# Patient Record
Sex: FEMALE | Race: AMERICAN INDIAN OR ALASKA NATIVE
[De-identification: names, ages, dates, MRNs, and addresses within clinical notes are randomized per-mention and may not be internally consistent; named-entity substitution may affect disease eponyms.]

---

## 2019-03-13 ENCOUNTER — HOSPITAL ENCOUNTER (EMERGENCY)
Dept: HOSPITAL 5 - ED | Age: 64
Discharge: HOME | End: 2019-03-13
Payer: SELF-PAY

## 2019-03-13 VITALS — DIASTOLIC BLOOD PRESSURE: 73 MMHG | SYSTOLIC BLOOD PRESSURE: 147 MMHG
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
## 2019-03-13 DIAGNOSIS — E11.9: ICD-10-CM

## 2019-03-13 DIAGNOSIS — S39.012A: Primary | ICD-10-CM

## 2019-03-13 DIAGNOSIS — M54.31: ICD-10-CM

## 2019-03-13 DIAGNOSIS — Z95.5: ICD-10-CM
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
## 2019-03-13 DIAGNOSIS — I25.2: ICD-10-CM

## 2019-03-13 DIAGNOSIS — I10: ICD-10-CM
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
## 2019-03-13 DIAGNOSIS — Z79.899: ICD-10-CM

## 2019-03-13 PROCEDURE — 96372 THER/PROPH/DIAG INJ SC/IM: CPT

## 2019-03-13 PROCEDURE — 73502 X-RAY EXAM HIP UNI 2-3 VIEWS: CPT

## 2019-03-13 PROCEDURE — 99283 EMERGENCY DEPT VISIT LOW MDM: CPT

## 2019-03-13 NOTE — EMERGENCY DEPARTMENT REPORT
ED Back Pain/Injury HPI





- General


Chief Complaint: Pain General


Stated Complaint: LEG PAIN/HARD TO WALK


Time Seen by Provider: 03/13/19 15:00


Source: patient


Limitations: No Limitations





- History of Present Illness


Initial Comments: 





This is a 63-year-old female nontoxic, well nourished in appearance, no acute 

signs of distress presents to the ED with c/o of acute on chronic lower back 

pain.  Patient stated that the past 2 days he was moving and developed this 

pain.  Patient states has history of sciatica nerve pain which is similar 

symptoms as today.  Patient states that pain radiates through to his right lower

extremity.  Patient denies any trauma.  Denies any bladder or bowel instability.

 Patient denies any urinary symptoms.  Denies any fever, chills, nausea, vomitin

g, headache, stiff neck, chest pain or shortness of breath.  Patient denies any 

numbness or tingling.  Denies any allergies. 


MD Complaint: back pain


Similar Symptoms Previously: Yes


Radiation: right leg


Severity: mild


Severity scale (0 -10): 8


Quality: aching


Consistency: intermittent


Improves With: immobilization


Worsens With: movement, walking


Context: while lifting, turning/twisting


Associated Symptoms: denies other symptoms.  denies: confusion, weakness, chest 

pain, numbness, difficulty walking, cough, difficulty urinating, diaphoresis, 

incontinence, fever/chills, constipation, headaches, abdominal pain, loss of 

appetite, malaise, nausea/vomiting, rash, seizure, shortness of breath, syncope





- Related Data


                                  Previous Rx's











 Medication  Instructions  Recorded  Last Taken  Type


 


Ibuprofen [Motrin] 600 mg PO Q8H PRN #50 tablet 08/26/14 Unknown Rx


 


Metoprolol [Lopressor TAB] 50 mg PO BID #60 tablet 10/29/14 04/13/15 Rx


 


glyBURIDE [Diabeta] 2.5 mg PO BID #60 tablet 10/29/14 04/13/15 Rx


 


metFORMIN [Glucophage] 850 mg PO BID #60 tablet 10/29/14 04/13/15 Rx


 


Pantoprazole Sodium [Protonix] 20 mg PO BID #60 tablet. 04/14/15 Unknown Rx


 


Simvastatin 20 mg PO QHS #30 tablet 04/14/15 Unknown Rx


 


Fluticasone [Flonase] 1 spray NS QDAY #1 bottle 12/01/15 Unknown Rx


 


predniSONE [Deltasone] 20 mg PO QDAY #5 tab 12/01/15 Unknown Rx


 


Cyclobenzaprine HCl [Flexeril 5 MG 5 mg PO TID #20 tab 12/20/15 Unknown Rx





TAB]    


 


Docusate Sodium [Colace] 100 mg PO BID #14 capsule 12/20/15 Unknown Rx


 


HYDROcodone/APAP 7.5-325 [Norco 1 each PO Q6HR PRN #20 tablet 12/20/15 Unknown 

Rx





7.5/325]    


 


Amoxicillin [Amoxicillin TAB] 875 mg PO BID #20 tablet 08/26/16 Unknown Rx


 


Benzonatate [Tessalon Perles] 100 mg PO Q8HR #20 capsule 08/26/16 Unknown Rx


 


Levothyroxine (Nf) [Synthroid (Nf)] 200 mcg PO QAM #30 tablet 08/26/16 Unknown 

Rx


 


Omeprazole 40 mg PO DAILY #30 capsule. 12/28/18 Unknown Rx


 


traMADol [Ultram 50 MG tab] 50 mg PO Q6HR PRN #12 tablet 12/28/18 Unknown Rx


 


Cyclobenzaprine HCl [Flexeril 5 MG 5 mg PO QHS PRN #10 tab 03/13/19 Unknown Rx





TAB]    


 


Ibuprofen [Motrin] 600 mg PO Q8H PRN #10 tablet 03/13/19 Unknown Rx











                                    Allergies











Allergy/AdvReac Type Severity Reaction Status Date / Time


 


No Known Allergies Allergy   Verified 12/20/15 04:24














ED Review of Systems


ROS: 


Stated complaint: LEG PAIN/HARD TO WALK


Other details as noted in HPI





Constitutional: denies: chills, fever


Eyes: denies: eye pain, eye discharge, vision change


ENT: denies: ear pain, throat pain


Respiratory: denies: cough, shortness of breath, wheezing


Cardiovascular: denies: chest pain, palpitations


Endocrine: no symptoms reported


Gastrointestinal: denies: abdominal pain, nausea, diarrhea


Genitourinary: denies: urgency, dysuria, discharge


Musculoskeletal: back pain.  denies: joint swelling, arthralgia


Skin: denies: rash, lesions


Neurological: denies: headache, weakness, paresthesias


Psychiatric: denies: anxiety, depression


Hematological/Lymphatic: denies: easy bleeding, easy bruising





ED Past Medical Hx





- Past Medical History


Previous Medical History?: Yes


Hx Hypertension: Yes


Hx Heart Attack/AMI: Yes


Hx Diabetes: Yes


Additional medical history: Chronic back pain





- Surgical History


Past Surgical History?: Yes


Hx Coronary Stent: Yes





- Social History


Smoking Status: Never Smoker


Substance Use Type: None





- Medications


Home Medications: 


                                Home Medications











 Medication  Instructions  Recorded  Confirmed  Last Taken  Type


 


Ibuprofen [Motrin] 600 mg PO Q8H PRN #50 tablet 08/26/14 04/14/15 Unknown Rx


 


Metoprolol [Lopressor TAB] 50 mg PO BID #60 tablet 10/29/14 04/14/15 04/13/15 Rx


 


glyBURIDE [Diabeta] 2.5 mg PO BID #60 tablet 10/29/14 04/14/15 04/13/15 Rx


 


metFORMIN [Glucophage] 850 mg PO BID #60 tablet 10/29/14 04/14/15 04/13/15 Rx


 


Pantoprazole Sodium [Protonix] 20 mg PO BID #60 tablet. 04/14/15  Unknown Rx


 


Simvastatin 20 mg PO QHS #30 tablet 04/14/15  Unknown Rx


 


Fluticasone [Flonase] 1 spray NS QDAY #1 bottle 12/01/15  Unknown Rx


 


predniSONE [Deltasone] 20 mg PO QDAY #5 tab 12/01/15  Unknown Rx


 


Cyclobenzaprine HCl [Flexeril 5 MG 5 mg PO TID #20 tab 12/20/15  Unknown Rx





TAB]     


 


Docusate Sodium [Colace] 100 mg PO BID #14 capsule 12/20/15  Unknown Rx


 


HYDROcodone/APAP 7.5-325 [Norco 1 each PO Q6HR PRN #20 tablet 12/20/15  Unknown 

Rx





7.5/325]     


 


Amoxicillin [Amoxicillin TAB] 875 mg PO BID #20 tablet 08/26/16  Unknown Rx


 


Benzonatate [Tessalon Perles] 100 mg PO Q8HR #20 capsule 08/26/16  Unknown Rx


 


Levothyroxine (Nf) [Synthroid (Nf)] 200 mcg PO QAM #30 tablet 08/26/16  Unknown 

Rx


 


Omeprazole 40 mg PO DAILY #30 capsule. 12/28/18  Unknown Rx


 


traMADol [Ultram 50 MG tab] 50 mg PO Q6HR PRN #12 tablet 12/28/18  Unknown Rx


 


Cyclobenzaprine HCl [Flexeril 5 MG 5 mg PO QHS PRN #10 tab 03/13/19  Unknown Rx





TAB]     


 


Ibuprofen [Motrin] 600 mg PO Q8H PRN #10 tablet 03/13/19  Unknown Rx














ED Physical Exam





- General


Limitations: No Limitations


General appearance: alert, in no apparent distress





- Head


Head exam: Present: atraumatic, normocephalic





- Extremities Exam


Extremities exam: Present: normal inspection, full ROM, normal capillary refill.

  Absent: tenderness, calf tenderness





- Back Exam


Back exam: Present: normal inspection, full ROM, paraspinal tenderness (right 

sided paraspinal lumbar).  Absent: tenderness, CVA tenderness (R), CVA 

tenderness (L), muscle spasm, vertebral tenderness, rash noted





- Expanded Back Exam


  ** Expanded


Back exam: Absent: saddle anesthesia


Back exam: Negative Straight Leg Raising: Right, Left





- Neurological Exam


Neurological exam: Present: alert, oriented X3





- Psychiatric


Psychiatric exam: Present: normal affect, normal mood





- Skin


Skin exam: Present: warm, dry, intact, normal color.  Absent: rash





ED Course





                                   Vital Signs











  03/13/19





  14:59


 


Temperature 97.9 F


 


Pulse Rate 79


 


Respiratory 18





Rate 


 


Blood Pressure 147/73


 


O2 Sat by Pulse 97





Oximetry 














- Reevaluation(s)


Reevaluation #1: 





03/13/19 16:17


Patient is speaking in full sentences with no signs of distress noted.





ED Medical Decision Making





- Medical Decision Making





This is a 63-year-old female that presents with sciatica.  Patient is stable was

 examined by me.  There is no spinal tenderness.  There is no cauda equina synd

royal during examination.  No bladder or bowel instability. Patient received 

Toradol 30 mg IM in the ED which preceded his symptoms has resolved and 

subsided.  Patient is discharged with muscle relaxant and Motrin.  Patient was 

instructed not to operate any machinery while taking muscle relaxant as they 

cause her drowsiness.  Patient was referred to Follow-up with a primary care 

doctor in 3-5 days or if symptoms worsen and continue return to emergency room 

as soon as possible.  At time of discharge, the patient does not seem toxic or 

ill in appearance.  No acute signs of distress noted.  Patient agrees to 

discharge treatment plan of care.  No further questions noted by the patient.





This chart is dictated with using Dragon Dictation Program


Critical care attestation.: 


If time is entered above; I have spent that time in minutes in the direct care 

of this critically ill patient, excluding procedure time.








ED Disposition


Clinical Impression: 


Low back strain


Qualifiers:


 Encounter type: initial encounter Qualified Code(s): S39.012A - Strain of 

muscle, fascia and tendon of lower back, initial encounter





Sciatica


Qualifiers:


 Laterality: right Qualified Code(s): M54.31 - Sciatica, right side





Disposition: DC-01 TO HOME OR SELFCARE


Is pt being admited?: No


Does the pt Need Aspirin: No


Condition: Stable


Instructions:  Sciatica (ED), Cyclobenzaprine (By mouth)


Additional Instructions: 


Follow-up with your primary care doctor in 3-5 days or if symptoms worsen such 

as bladder or bowel stability, chest pain, short of breath, numbness or tingling

 sensation in extremities, headache, dizziness, visual changes, nausea vomiting,

 or abdominal pain, return back to emergency room as was possible.


Take ibuprofen and Flexeril as prescribed.  Do not operate heavy machinery while

 taking Flexeril due to sedation


Prescriptions: 


Cyclobenzaprine HCl [Flexeril 5 MG TAB] 5 mg PO QHS PRN #10 tab


 PRN Reason: Muscle Spasm


Ibuprofen [Motrin] 600 mg PO Q8H PRN #10 tablet


 PRN Reason: Pain


Referrals: 


[facility] [Primary Care Provider] - 3-5 Days


PRIMARY CARE,MD [Referring] - 3-5 Days


KARTHIKEYAN MACHADO MD [Staff Physician] - 3-5 Days


[facility] [Outside] - 3-5 Days

## 2019-03-13 NOTE — XRAY REPORT
RIGHT HIP:



Pain.

The bony architecture is intact without evidence of fracture or

dislocation.  No significant soft tissue abnormality is seen. Of note 

is a small spur at the superior lateral acetabulum margin of the left 

hip. Normal acetabular joints with sub-chondral cysts involving the 

iliac bones adjacent to the inferior SI joints bilaterally.



IMPRESSION:

Normal right hip. Other findings as described.

## 2019-03-13 NOTE — EMERGENCY DEPARTMENT REPORT
Chief Complaint: Pain General


Stated Complaint: LEG PAIN/HARD TO WALK


Time Seen by Provider: 03/13/19 15:00





- HPI


History of Present Illness: 





This is a 63 y.o. female that presents with right hip pain radiating down lower 

extremities.





- ROS


Review of Systems: 





lower extremities: right hip pain radiating down leg





- Exam


Vital Signs: 


                                   Vital Signs











  03/13/19





  14:59


 


Temperature 97.9 F


 


Pulse Rate 79


 


Respiratory 18





Rate 


 


Blood Pressure 147/73


 


O2 Sat by Pulse 97





Oximetry 











MSE screening note: 


Focused history and physical exam performed.


Due to findings the following was ordered:





XR of right hip.


Fast track for further evaluation.





ED Disposition for MSE


Condition: Stable

## 2019-11-19 ENCOUNTER — HOSPITAL ENCOUNTER (EMERGENCY)
Dept: HOSPITAL 5 - ED | Age: 64
LOS: 1 days | Discharge: HOME | End: 2019-11-20
Payer: COMMERCIAL
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
## 2019-11-19 DIAGNOSIS — I25.2: ICD-10-CM

## 2019-11-19 DIAGNOSIS — Z79.84: ICD-10-CM

## 2019-11-19 DIAGNOSIS — Z95.5: ICD-10-CM
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
## 2019-11-19 DIAGNOSIS — S76.011A: Primary | ICD-10-CM

## 2019-11-19 DIAGNOSIS — E11.9: ICD-10-CM
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
## 2019-11-19 DIAGNOSIS — Z79.899: ICD-10-CM

## 2019-11-19 DIAGNOSIS — I10: ICD-10-CM
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
## 2019-11-19 DIAGNOSIS — M54.5: ICD-10-CM

## 2019-11-19 DIAGNOSIS — G89.29: ICD-10-CM

## 2019-11-19 PROCEDURE — 72100 X-RAY EXAM L-S SPINE 2/3 VWS: CPT
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
## 2019-11-19 NOTE — EVENT NOTE
ED Screening Note


Date of service: 11/19/19


Time: 22:13


ED Screening Note: 


65 y/o female come for right hip pain and lower back pain. 





This initial assessment/diagnostic orders/clinical plan/treatment(s) is/are 

subject to change based on patients health status, clinical progression and 

re-assessment by fellow clinical providers in the ED. Further treatment and 

workup at subsequent clinical providers discretion. Patient/guardian urged not 

to elope from the ED as their condition may be serious if not clinically 

assessed and managed. 





Initial orders include:
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
## 2019-11-20 VITALS — DIASTOLIC BLOOD PRESSURE: 72 MMHG | SYSTOLIC BLOOD PRESSURE: 136 MMHG
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
